# Patient Record
Sex: MALE | NOT HISPANIC OR LATINO | URBAN - METROPOLITAN AREA
[De-identification: names, ages, dates, MRNs, and addresses within clinical notes are randomized per-mention and may not be internally consistent; named-entity substitution may affect disease eponyms.]

---

## 2021-11-13 ENCOUNTER — EMERGENCY (EMERGENCY)
Facility: HOSPITAL | Age: 86
LOS: 1 days | Discharge: HOME | End: 2021-11-13
Attending: EMERGENCY MEDICINE | Admitting: EMERGENCY MEDICINE
Payer: MEDICARE

## 2021-11-13 VITALS
HEART RATE: 75 BPM | SYSTOLIC BLOOD PRESSURE: 151 MMHG | TEMPERATURE: 98 F | OXYGEN SATURATION: 100 % | RESPIRATION RATE: 16 BRPM | DIASTOLIC BLOOD PRESSURE: 84 MMHG

## 2021-11-13 VITALS
OXYGEN SATURATION: 95 % | TEMPERATURE: 99 F | RESPIRATION RATE: 16 BRPM | DIASTOLIC BLOOD PRESSURE: 74 MMHG | SYSTOLIC BLOOD PRESSURE: 128 MMHG | HEART RATE: 96 BPM

## 2021-11-13 DIAGNOSIS — Z79.01 LONG TERM (CURRENT) USE OF ANTICOAGULANTS: ICD-10-CM
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
## 2021-11-13 DIAGNOSIS — Z23 ENCOUNTER FOR IMMUNIZATION: ICD-10-CM

## 2021-11-13 DIAGNOSIS — S01.81XA LACERATION WITHOUT FOREIGN BODY OF OTHER PART OF HEAD, INITIAL ENCOUNTER: ICD-10-CM
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
## 2021-11-13 DIAGNOSIS — S01.112A LACERATION WITHOUT FOREIGN BODY OF LEFT EYELID AND PERIOCULAR AREA, INITIAL ENCOUNTER: ICD-10-CM

## 2021-11-13 PROCEDURE — G1004: CPT

## 2021-11-13 PROCEDURE — 90715 TDAP VACCINE 7 YRS/> IM: CPT

## 2021-11-13 PROCEDURE — 70450 CT HEAD/BRAIN W/O DYE: CPT | Mod: MG

## 2021-11-13 PROCEDURE — 70480 CT ORBIT/EAR/FOSSA W/O DYE: CPT | Mod: MG

## 2021-11-13 PROCEDURE — 99284 EMERGENCY DEPT VISIT MOD MDM: CPT | Mod: 25

## 2021-11-13 PROCEDURE — 70450 CT HEAD/BRAIN W/O DYE: CPT | Mod: 26,MG

## 2021-11-13 PROCEDURE — 70480 CT ORBIT/EAR/FOSSA W/O DYE: CPT | Mod: 26,59,MG

## 2021-11-13 PROCEDURE — 99284 EMERGENCY DEPT VISIT MOD MDM: CPT

## 2021-11-13 PROCEDURE — 90471 IMMUNIZATION ADMIN: CPT

## 2021-11-13 RX ORDER — TETANUS TOXOID, REDUCED DIPHTHERIA TOXOID AND ACELLULAR PERTUSSIS VACCINE, ADSORBED 5; 2.5; 8; 8; 2.5 [IU]/.5ML; [IU]/.5ML; UG/.5ML; UG/.5ML; UG/.5ML
0.5 SUSPENSION INTRAMUSCULAR ONCE
Refills: 0 | Status: COMPLETED | OUTPATIENT
Start: 2021-11-13 | End: 2021-11-13

## 2021-11-13 RX ADMIN — TETANUS TOXOID, REDUCED DIPHTHERIA TOXOID AND ACELLULAR PERTUSSIS VACCINE, ADSORBED 0.5 MILLILITER(S): 5; 2.5; 8; 8; 2.5 SUSPENSION INTRAMUSCULAR at 07:35

## 2021-11-13 NOTE — ED ADULT NURSE NOTE - OBJECTIVE STATEMENT
(+) head strike, on eliquis, laceration to L forehead, no LOC. reports reaching for clothing, losing balance and falling. no c/o pain, A+O x 2 at baseline, answering questions appropriately. from [facility]

## 2021-11-13 NOTE — ED PROVIDER NOTE - NSFOLLOWUPINSTRUCTIONS_ED_ALL_ED_FT
Facial Laceration  A facial laceration is a cut on the face. You may need to see a doctor for treatment  Treatment may help the wound heal and prevent scars.  What are the causes?  •A car crash.  •An injury when playing sports.  •An attack by a person or animal.  •A fall.  What are the signs or symptoms?  •A cut on the face.  •Bleeding.  •Pain.  •Swelling.  •Bruises.  How is this treated  •Your wound will be cleaned. This will help prevent infection.  •Your wound may be closed. Your doctor will use stitches, skin glue, or skin tape strips to do this.  •You may also be given medicines, such as:  •Medicines for pain.  •Medicines to prevent or treat infection (antibiotics). This might be pills or an ointment.  •A tetanus shot.  Follow these instructions at home:  Follow your doctor's instructions. Ask your doctor if you have problems or questions. Caring for your wound depends on how it was closed.  If you have a bandage:   •Wash your hands with soap and water for at least 20 seconds before and after you change your bandage. If you cannot use soap and water, use hand .  •Change your bandage.  If stitches were used:   •Keep the wound clean and dry.  •If you were given a bandage, change it at least one time a day, or as told by your doctor. Also change the bandage if it gets wet or dirty.  •Wash the wound with soap and water two times a day, or as told by your doctor. Rinse off the soap with water. Use a clean towel to pat the wound dry.  •After cleaning, put a thin layer of antibiotic ointment on the wound as told by your doctor. This helps prevent infection and keeps the bandage from sticking to the wound.  •You may shower after the first 24 hours. Do not soak the wound until the stitches are taken out.  •Go back to have your stitches taken out as told by your doctor.  • Do not wear makeup around the wound until your doctor says it is okay.  If skin glue was used:   •You may only wet your wound in the shower or bath very briefly.  •After you shower or take a bath, use a clean towel to gently pat the wound dry.  • Do not soak or scrub the wound.  • Do not swim.  • Do not do anything that makes you sweat a lot until the skin glue has fallen off on its own.  • Do not put medicines, creams, ointment, or makeup on your wound while the skin glue is in place. This may loosen the glue before your wound is healed.  • Do not put tape over the skin glue if you have a bandage. This may pull off the skin glue.  • Do not spend a long time in the sun or use a tanning lamp while the skin glue is on the wound.  • Do not pick at the skin glue. The skin glue usually stays in place for 5–10 days. Then, it falls off the skin.  If skin tape strips were used:   •Keep the wound clean and dry.  • Do not let the skin tape strips get wet.  •Take care to keep the wound and skin tape strips dry when you take a bath. If the wound gets wet, pat it dry with a clean towel right away.  •Skin tape strips fall off on their own over time. You may trim the strips as the wound heals. Do not take off skin tape strips that are still stuck to the wound.  General instructions   •Check your wound area every day for signs of infection. Check for:  •More redness, swelling, or pain.  •Fluid or blood.  •Warmth.  •Pus or a bad smell.  •Take over-the-counter and prescription medicines only as told by your doctor.  •If you were prescribed an antibiotic medicine, use it as told by your doctor. Do not stop using it even if you start to feel better.  •After the cut has healed, put sunscreen on the area to prevent scars. It can take a year or two for redness and scars to fade.  Contact a doctor if:  •You have a fever.  •You have any of these signs of infection in or around your wound:  •More redness, swelling, or pain  •Fluid or blood.  •Warmth.  •Pus or a bad smell.  Get help right away if:  •You have a red streak going away from your wound.  Summary  •A cut on the face may need to be closed with stitches, skin glue, or skin tape strips.      •Follow your doctor's instructions for wound care.      •Check your wound area every day for signs of infection, such as more redness, swelling, or pain. FOLLOW UP WITH DR ABRAHAM ON FRIDAY  Facial Laceration  A facial laceration is a cut on the face. You may need to see a doctor for treatment  Treatment may help the wound heal and prevent scars.  What are the causes?  •A car crash.  •An injury when playing sports.  •An attack by a person or animal.  •A fall.  What are the signs or symptoms?  •A cut on the face.  •Bleeding.  •Pain.  •Swelling.  •Bruises.  How is this treated  •Your wound will be cleaned. This will help prevent infection.  •Your wound may be closed. Your doctor will use stitches, skin glue, or skin tape strips to do this.  •You may also be given medicines, such as:  •Medicines for pain.  •Medicines to prevent or treat infection (antibiotics). This might be pills or an ointment.  •A tetanus shot.  Follow these instructions at home:  Follow your doctor's instructions. Ask your doctor if you have problems or questions. Caring for your wound depends on how it was closed.  If you have a bandage:   •Wash your hands with soap and water for at least 20 seconds before and after you change your bandage. If you cannot use soap and water, use hand .  •Change your bandage.  If stitches were used:   •Keep the wound clean and dry.  •If you were given a bandage, change it at least one time a day, or as told by your doctor. Also change the bandage if it gets wet or dirty.  •Wash the wound with soap and water two times a day, or as told by your doctor. Rinse off the soap with water. Use a clean towel to pat the wound dry.  •After cleaning, put a thin layer of antibiotic ointment on the wound as told by your doctor. This helps prevent infection and keeps the bandage from sticking to the wound.  •You may shower after the first 24 hours. Do not soak the wound until the stitches are taken out.  •Go back to have your stitches taken out as told by your doctor.  • Do not wear makeup around the wound until your doctor says it is okay.  If skin glue was used:   •You may only wet your wound in the shower or bath very briefly.  •After you shower or take a bath, use a clean towel to gently pat the wound dry.  • Do not soak or scrub the wound.  • Do not swim.  • Do not do anything that makes you sweat a lot until the skin glue has fallen off on its own.  • Do not put medicines, creams, ointment, or makeup on your wound while the skin glue is in place. This may loosen the glue before your wound is healed.  • Do not put tape over the skin glue if you have a bandage. This may pull off the skin glue.  • Do not spend a long time in the sun or use a tanning lamp while the skin glue is on the wound.  • Do not pick at the skin glue. The skin glue usually stays in place for 5–10 days. Then, it falls off the skin.  If skin tape strips were used:   •Keep the wound clean and dry.  • Do not let the skin tape strips get wet.  •Take care to keep the wound and skin tape strips dry when you take a bath. If the wound gets wet, pat it dry with a clean towel right away.  •Skin tape strips fall off on their own over time. You may trim the strips as the wound heals. Do not take off skin tape strips that are still stuck to the wound.  General instructions   •Check your wound area every day for signs of infection. Check for:  •More redness, swelling, or pain.  •Fluid or blood.  •Warmth.  •Pus or a bad smell.  •Take over-the-counter and prescription medicines only as told by your doctor.  •If you were prescribed an antibiotic medicine, use it as told by your doctor. Do not stop using it even if you start to feel better.  •After the cut has healed, put sunscreen on the area to prevent scars. It can take a year or two for redness and scars to fade.  Contact a doctor if:  •You have a fever.  •You have any of these signs of infection in or around your wound:  •More redness, swelling, or pain  •Fluid or blood.  •Warmth.  •Pus or a bad smell.  Get help right away if:  •You have a red streak going away from your wound.  Summary  •A cut on the face may need to be closed with stitches, skin glue, or skin tape strips.      •Follow your doctor's instructions for wound care.      •Check your wound area every day for signs of infection, such as more redness, swelling, or pain.

## 2021-11-13 NOTE — ED ADULT NURSE NOTE - NS ED NURSE DC INFO COMPLEXITY
Straightforward: Basic instructions, no meds, no home treatment/Patient asked questions/Returned Demonstration

## 2021-11-13 NOTE — ED PROVIDER NOTE - OBJECTIVE STATEMENT
The pt is a 89 y/o M, who presents to ED c/o cut to face s/p fall - was reaching for clothing, lost his balance and fell. + bleeding, + local pain. Last Td? Denies loc, h/a, visual changes, n/v, neck pain, any other injuries.

## 2021-11-13 NOTE — ED PROVIDER NOTE - PSYCHIATRIC, MLM
Alert and oriented to person, place, time/situation. normal mood and affect. no apparent risk to self or others. AAOx2. normal mood and affect. no apparent risk to self or others.

## 2021-11-13 NOTE — ED PROVIDER NOTE - ATTENDING CONTRIBUTION TO CARE
I discussed the plan of care of the patient directly with the PA and examined the patient while in the Emergency Department. I agree with the HPI and PE as documented by the PA.  Pt is a 89yo m, who p/w forehead lac s/p fall. No loc. No c/o ha, dizziness, visual changes, neck/ back pain, nausea, vomiting, n/t/w... Last td unknown. PE as above w/ macerated, jagged edge lac to left forehead w/ associated ecchymosis and mild swelling, no bony depressions. 3.5 cm lac to L forehead/L eyebrow. Neuro exam non-focal. No c-spine tenderness. Will give td booster, obtain ct head and plastics consult.

## 2021-11-13 NOTE — ED ADULT TRIAGE NOTE - OTHER COMPLAINTS
(+) head strike, on eliquis, laceration to L forehead, no LOC. reports reaching for clothing, losing balance and falling. no c/o pain, A+O x 2 at baseline, answering questions appropriately

## 2021-11-13 NOTE — ED ADULT NURSE REASSESSMENT NOTE - NS ED NURSE REASSESS COMMENT FT1
Handoff from previous shift. Pt presents A&OX4, laceration noted to the L forehead. NAD noted at this time.     Pending CT and laceration repair.

## 2021-11-13 NOTE — ED PROVIDER NOTE - CLINICAL SUMMARY MEDICAL DECISION MAKING FREE TEXT BOX
pt s/p mechanical fall, hit face on floor and sustained a complex lac, last Td? no loc, no h/a, no n/v, no other injuries, ct head/orbits  , td updated, plastics consulted for lac repair, wound care and f/u w/plastics, pt understands and agrees w/plan pt s/p mechanical fall, hit face on floor and sustained a complex lac, last Td? no loc, no h/a, no n/v, no other injuries, ct head/orbits neg, td updated, plastics consulted for lac repair, wound care and f/u w/plastics, pt understands and agrees w/plan

## 2021-11-13 NOTE — ED ADULT NURSE NOTE - NSIMPLEMENTINTERV_GEN_ALL_ED
Implemented All Universal Safety Interventions:  [place] to call system. Call bell, personal items and telephone within reach. Instruct patient to call for assistance. Room bathroom lighting operational. Non-slip footwear when patient is off stretcher. Physically safe environment: no spills, clutter or unnecessary equipment. Stretcher in lowest position, wheels locked, appropriate side rails in place.

## 2021-11-13 NOTE — ED PROVIDER NOTE - ENMT, MLM
Airway patent, Nasal mucosa clear. Mouth with normal mucosa. Throat has no vesicles, no oropharyngeal exudates and uvula is midline. Ears: no hemotympanum b/l. + swelling and ecchymosis to L superior orbit, + jagged, + macerated, 3.5 cm lac to L forehead/L eyebrow, + oozing, no fb, no orbital tend b/l, no nasal bridge tend, no vera signs b/l, no raccoon eyes b/l

## 2021-11-13 NOTE — ED PROVIDER NOTE - PATIENT PORTAL LINK FT
You can access the FollowMyHealth Patient Portal offered by [facility] by registering at the following website: [email or web address]. By joining [employer]’s FollowMyHealth portal, you will also be able to view your health information using other applications (apps) compatible with our system.

## 2021-11-13 NOTE — ED PROVIDER NOTE - CARE PROVIDER_API CALL
Celestine Chicas)  Plastic Surgery; Surgery  [address] [place] [id number]  Phone: [phone]  Fax: [phone]  Follow Up Time: